# Patient Record
Sex: FEMALE | Race: WHITE | NOT HISPANIC OR LATINO | URBAN - METROPOLITAN AREA
[De-identification: names, ages, dates, MRNs, and addresses within clinical notes are randomized per-mention and may not be internally consistent; named-entity substitution may affect disease eponyms.]

---

## 2018-06-12 ENCOUNTER — OFFICE VISIT (OUTPATIENT)
Dept: DERMATOLOGY | Facility: CLINIC | Age: 48
End: 2018-06-12
Payer: COMMERCIAL

## 2018-06-12 DIAGNOSIS — B35.4 TINEA CORPORIS: Primary | ICD-10-CM

## 2018-06-12 DIAGNOSIS — L82.1 SEBORRHEIC KERATOSES: ICD-10-CM

## 2018-06-12 RX ORDER — CLOTRIMAZOLE 1 %
CREAM (GRAM) TOPICAL 2 TIMES DAILY
Qty: 60 G | Refills: 0 | Status: SHIPPED | OUTPATIENT
Start: 2018-06-12

## 2018-06-12 ASSESSMENT — PAIN SCALES - GENERAL: PAINLEVEL: NO PAIN (0)

## 2018-06-12 NOTE — PROGRESS NOTES
[facility] Dermatology Note      Dermatology Problem List:  1. Tinea corporis - back - clotrimazole 1% cream BID  2. SK - left flank  3. Hx of allergic contact derm to nickel    CC:   Chief Complaint   Patient presents with     Skin Check     Yarely is here today for a skin check- has a couple areas of concern.          Encounter Date: Jun 12, 2018    History of Present Illness:  Ms. Yarely Garcia is a 48 year old female who presents with a spot of concern on her back which has been there about 1 year. She says it has gotten worse and bigger over the last year.  says it looks like a bruise, but it wont go away. It occasionally itches sometimes. No tenderness no swelling.  She also has a mole on her back she would like examined. She has no personal or fhx of skin cancer. She is otherwise healthy.     She baig have a nickel allergy - sos she was wondering if the metal parts of her bra were irritating her skin, but she says typically the reaction she gets with nickel is worse than what this has been.     Past Medical History:   There is no problem list on file for this patient.    History reviewed. No pertinent past medical history.  History reviewed. No pertinent surgical history.    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 The patient admits to use of tanning beds as a teenager, maybe 2x. She uses sunscreen on a regular basis.    Family History:  There is no family history of skin cancer. There is no family history of melanoma.    Medications:  No current outpatient prescriptions on file.     Allergies   Allergen Reactions     Nickel        Review of Systems:  -Constitutional: The patient denies fatigue, fevers, chills, unintended weight loss, and night sweats.  -Skin: As above in HPI. No additional skin concerns.    Physical exam:  Vitals: There were no vitals taken for this visit.  GEN: This is a well developed, well-nourished female in no acute  distress, in a pleasant mood.    SKIN: Waist-up skin, which includes the head/face, neck, both arms, chest, back, abdomen, digits and/or nails was examined.  -3cm slightly hyperpigmented patch with slight scale on the mid back under the bra area  -1 waxy, stuck on papule on the left flank  -No other lesions of concern on areas examined.     Impression/Plan:  1. Tinea corporis - mid back    KOH positive for spores    Clotrimazole 1% cream BID to affected area x 14 days    Discussed the hyperpigmentation may take a bit longer to resolve.     2. Seborrheic keratosis    Reassured benign    edu on etiology of lesions    CC Dr. Corral on close of this encounter.  Follow-up prn for new or changing lesions.       Staff Involved:  Staff Only  All risks, benefits and alternatives were discussed with patient.  Patient is in agreement and understands the assessment and plan.  All questions were answered.    Shyanne Cardona PA-C  [facility]: Phone: [phone], Fax: [phone]

## 2018-06-12 NOTE — LETTER
6/12/2018       RE: Yarely Garcia  [address]  [place] [address]     Dear Colleague,    Thank you for referring your patient, Yarely Garcia, to the [facility] DERMATOLOGY at [facility]. Please see a copy of my visit note below.    [facility] Dermatology Note      Dermatology Problem List:  1. Tinea corporis - back - clotrimazole 1% cream BID  2. SK - left flank  3. Hx of allergic contact derm to nickel    CC:   Chief Complaint   Patient presents with     Skin Check     Yarely is here today for a skin check- has a couple areas of concern.          Encounter Date: Jun 12, 2018    History of Present Illness:  Ms. Yarely aGrcia is a 48 year old female who presents with a spot of concern on her back which has been there about 1 year. She says it has gotten worse and bigger over the last year.  says it looks like a bruise, but it wont go away. It occasionally itches sometimes. No tenderness no swelling.  She also has a mole on her back she would like examined. She has no personal or fhx of skin cancer. She is otherwise healthy.     She baig have a nickel allergy - sos she was wondering if the metal parts of her bra were irritating her skin, but she says typically the reaction she gets with nickel is worse than what this has been.     Past Medical History:   There is no problem list on file for this patient.    History reviewed. No pertinent past medical history.  History reviewed. No pertinent surgical history.    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. The patient admits to use of tanning beds as a teenager, maybe 2x. She uses sunscreen on a regular basis.    Family History:  There is no family history of skin cancer. There is no family history of melanoma.    Medications:  No current outpatient prescriptions on file.     Allergies   Allergen Reactions     Nickel        Review of Systems:  -Constitutional:  The patient denies fatigue, fevers, chills, unintended weight loss, and night sweats.  -Skin: As above in HPI. No additional skin concerns.    Physical exam:  Vitals: There were no vitals taken for this visit.  GEN: This is a well developed, well-nourished female in no acute distress, in a pleasant mood.    SKIN: Waist-up skin, which includes the head/face, neck, both arms, chest, back, abdomen, digits and/or nails was examined.  -3cm slightly hyperpigmented patch with slight scale on the mid back under the bra area  -1 waxy, stuck on papule on the left flank  -No other lesions of concern on areas examined.     Impression/Plan:  1. Tinea corporis - mid back    KOH positive for spores    Clotrimazole 1% cream BID to affected area x 14 days    Discussed the hyperpigmentation may take a bit longer to resolve.     2. Seborrheic keratosis    Reassured benign    edu on etiology of lesions    CC Dr. Corral on close of this encounter.  Follow-up prn for new or changing lesions.       Staff Involved:  Staff Only  All risks, benefits and alternatives were discussed with patient.  Patient is in agreement and understands the assessment and plan.  All questions were answered.    Shyanne Cardona PA-C  [facility]: Phone: [phone], Fax: [phone]              Again, thank you for allowing me to participate in the care of your patient.      Sincerely,    Shyanne Cardona PA-C

## 2018-06-12 NOTE — MR AVS SNAPSHOT
After Visit Summary   2018    Yarely Garcia    MRN: [id number]           Patient Information     Date Of Birth          1970        Visit Information        Provider Department      2018 11:00 AM Shyanne Cardona PA-C [facility] Dermatology        Today's Diagnoses     Tinea corporis    -  1    Seborrheic keratoses           Follow-ups after your visit        Who to contact     Please call your clinic at [phone] to:    Ask questions about your health    Make or cancel appointments    Discuss your medicines    Learn about your test results    Speak to your doctor            Additional Information About Your Visit        MyChart Information     EZChip is an electronic gateway that provides easy, online access to your medical records. With EZChip, you can request a clinic appointment, read your test results, renew a prescription or communicate with your care team.     To sign up for [employer] visit the website at [email or web address]   You will be asked to enter the access code listed below, as well as some personal information. Please follow the directions to create your username and password.     Your access code is: [email or web address]  Expires: 9/10/2018 11:00 AM     Your access code will  in 90 days. If you need help or a new code, please contact your [facility] or call [phone] for assistance.        Care EveryWhere ID     This is your Care EveryWhere ID. This could be used by other organizations to access your [facility] medical records  [id number]         Blood Pressure from Last 3 Encounters:   No data found for BP    Weight from Last 3 Encounters:   No data found for Wt              Today, you had the following     No orders found for display         Today's Medication Changes          These changes are accurate as of 18 11:24 AM.  If you have any questions, ask your nurse or doctor.               Start taking these medicines.         Dose/Directions    clotrimazole 1 % cream   Commonly known as:  LOTRIMIN   Used for:  Tinea corporis   Started by:  Shyanne Cardona PA-C        Apply topically 2 times daily   Quantity:  60 g   Refills:  0            Where to get your medicines      These medications [facility] to [facility] [address] [place] [address]  [address], [place] [address]     Phone:  [phone]     clotrimazole 1 % cream                Primary Care Provider    None Specified       No primary provider on file.        Equal Access to Services     [facility]: Hadii aad ku hadasho Soomaali, waaxda luqadaha, qaybta kaalmada adeegyada, demetrice matos . So [facility] [phone].    ATENCIÓN: Si habla español, tiene a quiñonze disposición servicios gratuitos de asistencia lingüística. [facility] [phone].    We comply with applicable federal civil rights laws and [place] laws. We do not discriminate on the basis of race, color, national origin, age, disability, sex, sexual orientation, or gender identity.            Thank you!     Thank you for choosing [facility] DERMATOLOG[facility]  for your care. Our goal is always to provide you with excellent care. Hearing back from our patients is one way we can continue to improve our services. Please take a few minutes to complete the written survey that you may receive in the mail after your visit with us. Thank you!             Your Updated Medication List - Protect others around you: Learn how to safely use, store and throw away your medicines at www.disposemymeds.org.          This list is accurate as of 6/12/18 11:24 AM.  Always use your most recent med list.                   Brand Name Dispense Instructions for use Diagnosis    clotrimazole 1 % cream    LOTRIMIN    60 g    Apply topically 2 times daily    Tinea corporis

## 2018-06-12 NOTE — LETTER
Date:June 13, 2018      Patient was self referred, no letter generated. Do not send.        [facility] Information

## 2018-06-12 NOTE — NURSING NOTE
Dermatology Rooming Note    Yarely Garcia's goals for this visit include:   Chief Complaint   Patient presents with     Skin Check     Yarely is here today for a skin check- has a couple areas of concern.      Sheyla Galicia MA

## 2018-06-14 ENCOUNTER — TRANSFERRED RECORDS (OUTPATIENT)
Dept: HEALTH INFORMATION MANAGEMENT | Facility: CLINIC | Age: 48
End: 2018-06-14

## 2018-06-26 ENCOUNTER — OFFICE VISIT (OUTPATIENT)
Dept: FAMILY MEDICINE | Facility: CLINIC | Age: 48
End: 2018-06-26
Payer: COMMERCIAL

## 2018-06-26 VITALS — DIASTOLIC BLOOD PRESSURE: 77 MMHG | SYSTOLIC BLOOD PRESSURE: 123 MMHG | HEART RATE: 58 BPM | OXYGEN SATURATION: 99 %

## 2018-06-26 DIAGNOSIS — D48.5 NEOPLASM OF UNCERTAIN BEHAVIOR OF SKIN: Primary | ICD-10-CM

## 2018-06-26 DIAGNOSIS — D22.9 MELANOCYTIC NEVUS, UNSPECIFIED LOCATION: ICD-10-CM

## 2018-06-26 PROCEDURE — 99213 OFFICE O/P EST LOW 20 MIN: CPT | Mod: 25 | Performed by: FAMILY MEDICINE

## 2018-06-26 PROCEDURE — 11300 SHAVE SKIN LESION 0.5 CM/<: CPT | Performed by: FAMILY MEDICINE

## 2018-06-26 PROCEDURE — 88305 TISSUE EXAM BY PATHOLOGIST: CPT | Mod: TC | Performed by: FAMILY MEDICINE

## 2018-06-26 NOTE — MR AVS SNAPSHOT
"              After Visit Summary   6/26/2018    Yarely Garcia    MRN: [id number]           Patient Information     Date Of Birth          1970        Visit Information        Provider Department      6/26/2018 8:40 AM Olivia Yun [place] [facility]        Today's Diagnoses     Neoplasm of uncertain behavior of skin    -  1      Care Instructions    FUTURE APPOINTMENTS  Follow up per pathology report.    WOUND CARE INSTRUCTIONS  1. Wash hands before every dressing change.  2. After 24 hours, change dressing daily.  3. Wash the wound area with a mild soap, then rinse.  4. Gently pat dry with a sterile gauze or Q-tip.  5. Using a Q-tip, apply Vaseline or Aquaphor only over entire wound. Do NOT use Neosporin - as many people react to neomycin.  6. Finally, cover with a bandage or sterile non-stick gauze with micropore paper tape.  7. Repeat once daily until wound has healed.      Soap, water and shampoo will not hurt this area.    Do not go swimming or take baths, but showering is encouraged.    Limit use of the area where the procedure was done for a few days to allow for optimal healing.    If you experience bleeding:  Wash hands and hold firm pressure on the area for 10 minutes without checking to see if the bleeding has stopped. \"Checking\" pulls off the protective wound clot and restarts the bleeding all over again. Re-apply pressure for 10 minutes if necessary to stop bleeding.  Use additional sterile gauze and tape to maintain pressure once bleeding has stopped.  If bleeding continues, then call back to clinic at [phone].    Signs of Infection:  Infection can occur in any area where skin has been disrupted.  If you notice persistent redness, swelling, colored drainage, increasing pain, fever or other signs of infection, please call us at: [phone] and ask to have me or my colleague paged. We will call you back to discuss.    Pathology Results:  You will be notified, " generally via letter or MyChart, in approximately 10 days. If there is anything we need to discuss or further treatment needed, I will call you to discuss it.    PATIENT INFORMATION : WOUNDS  During the healing process you will notice a number of changes. All wounds develop a small halo of redness surrounding the wound.  This means healing is occurring. Severe itching with extensive redness usually indicates sensitivity to the ointment or bandage tape used to dress the wound.  You should call our office if this develops.      Swelling  and/or discoloration around your surgical site is common, particularly when performed around the eye.    All wounds normally drain.  The larger the wound the more drainage there will be.  After 7-10 days, you will notice the wound beginning to shrink and new skin will begin to grow.  The wound is healed when you can see skin has formed over the entire area.  A healed wound has a healthy, shiny look to the surface and is red to dark pink in color to normalize.  Wounds may take approximately 4-6 weeks to heal.  Larger wounds may take 6-8 weeks. After the wound is healed you may discontinue dressing changes.    You may experience a sensation of tightness as your wound heals. This is normal and will gradually subside.    Your healed wound may be sensitive to temperature changes. This sensitivity improves with time, but if you re having a lot of discomfort, try to avoid temperature extremes.    Patients frequently experience itching after their wound appears to have healed because of the continue healing under the skin.  Plain Vaseline will help relieve the itching.          Follow-ups after your visit        Who to contact     If you have questions or need follow up information about today's clinic visit or your schedule please contact [facility] directly at [phone].  Normal or non-critical lab and imaging results will be communicated to you by MyChart, letter or  phone within 4 business days after the clinic has received the results. If you do not hear from us within 7 days, please contact the clinic through rubberit or phone. If you have a critical or abnormal lab result, we will notify you by phone as soon as possible.  Submit refill requests through rubberit or call your pharmacy and they will forward the refill request to us. Please allow 3 business days for your refill to be completed.          Additional Information About Your Visit        [employer] Software Information     rubberit gives you secure access to your electronic health record. If you see a primary care provider, you can also send messages to your care team and make appointments. If you have questions, please call your primary care clinic.  If you do not have a primary care provider, please call [phone] and they will assist you.        Care EveryWhere ID     This is your Care EveryWhere ID. This could be used by other organizations to access your [facility] medical records  [id number]        Your Vitals Were     Pulse Pulse Oximetry                58 99%           Blood Pressure from Last 3 Encounters:   06/26/18 123/77    Weight from Last 3 Encounters:   No data found for Wt              Today, you had the following     No orders found for display       Primary Care Provider Office Phone # Fax #    [facility] [phone]       [address] [place] [address]        Equal Access to Services     [facility] : Hadii juan pablo ku hadasho Soomaali, waaxda luqadaha, qaybta kaalmada adeegyada, waxcarly idiin hayaan geovanna matos . So [facility] [phone].    ATENCIÓN: Si habla español, tiene a quiñonez disposición servicios gratuitos de asistencia lingüística. [facility] al [phone].    We comply with applicable federal civil rights laws and [place] laws. We do not discriminate on the basis of race, color, national origin, age, disability, sex, sexual orientation, or gender  identity.            Thank you!     Thank you for choosing [facility]  for your care. Our goal is always to provide you with excellent care. Hearing back from our patients is one way we can continue to improve our services. Please take a few minutes to complete the written survey that you may receive in the mail after your visit with us. Thank you!             Your Updated Medication List - Protect others around you: Learn how to safely use, store and throw away your medicines at www.disposemymeds.org.          This list is accurate as of 6/26/18  8:51 AM.  Always use your most recent med list.                   Brand Name Dispense Instructions for use Diagnosis    clotrimazole 1 % cream    LOTRIMIN    60 g    Apply topically 2 times daily    Tinea corporis

## 2018-06-26 NOTE — PATIENT INSTRUCTIONS
"FUTURE APPOINTMENTS  Follow up per pathology report.    WOUND CARE INSTRUCTIONS  1. Wash hands before every dressing change.  2. After 24 hours, change dressing daily.  3. Wash the wound area with a mild soap, then rinse.  4. Gently pat dry with a sterile gauze or Q-tip.  5. Using a Q-tip, apply Vaseline or Aquaphor only over entire wound. Do NOT use Neosporin - as many people react to neomycin.  6. Finally, cover with a bandage or sterile non-stick gauze with micropore paper tape.  7. Repeat once daily until wound has healed.      Soap, water and shampoo will not hurt this area.    Do not go swimming or take baths, but showering is encouraged.    Limit use of the area where the procedure was done for a few days to allow for optimal healing.    If you experience bleeding:  Wash hands and hold firm pressure on the area for 10 minutes without checking to see if the bleeding has stopped. \"Checking\" pulls off the protective wound clot and restarts the bleeding all over again. Re-apply pressure for 10 minutes if necessary to stop bleeding.  Use additional sterile gauze and tape to maintain pressure once bleeding has stopped.  If bleeding continues, then call back to clinic at [phone].    Signs of Infection:  Infection can occur in any area where skin has been disrupted.  If you notice persistent redness, swelling, colored drainage, increasing pain, fever or other signs of infection, please call us at: [phone] and ask to have me or my colleague paged. We will call you back to discuss.    Pathology Results:  You will be notified, generally via letter or MyChart, in approximately 10 days. If there is anything we need to discuss or further treatment needed, I will call you to discuss it.    PATIENT INFORMATION : WOUNDS  During the healing process you will notice a number of changes. All wounds develop a small halo of redness surrounding the wound.  This means healing is occurring. Severe itching with extensive " redness usually indicates sensitivity to the ointment or bandage tape used to dress the wound.  You should call our office if this develops.      Swelling  and/or discoloration around your surgical site is common, particularly when performed around the eye.    All wounds normally drain.  The larger the wound the more drainage there will be.  After 7-10 days, you will notice the wound beginning to shrink and new skin will begin to grow.  The wound is healed when you can see skin has formed over the entire area.  A healed wound has a healthy, shiny look to the surface and is red to dark pink in color to normalize.  Wounds may take approximately 4-6 weeks to heal.  Larger wounds may take 6-8 weeks. After the wound is healed you may discontinue dressing changes.    You may experience a sensation of tightness as your wound heals. This is normal and will gradually subside.    Your healed wound may be sensitive to temperature changes. This sensitivity improves with time, but if you re having a lot of discomfort, try to avoid temperature extremes.    Patients frequently experience itching after their wound appears to have healed because of the continue healing under the skin.  Plain Vaseline will help relieve the itching.

## 2018-06-26 NOTE — LETTER
"    6/26/2018         RE: Yarely Garcia  [address]  [place] [address]        Dear Colleague,    Thank you for referring your patient, Yarely Garcia, [facility]. Please see a copy of my visit note below.    [facility] - PRIMARY CARE SKIN    CC : Lesion(s)  SUBJECTIVE:                                                    Yarely Garcia is a 48 year old female who presents to clinic today because of a bleeding mole on the left arm.    Bothersome lesions noticed by the patient or other skin concerns :  Issue One : This mole on the left arm has been bleeding. She first noticed this 1 week ago when taking off a sweater. This has been catching on clothing. This mole has always been \"upraised\". She denies tick bite.  Onset : unknown.  Enlarging : unsure.  Bleeding : YES  Itchy or irritating : YES.  Pain or tenderness : YES.  Changing color : NO.  Issue Two :  A scab has developed over the last 1 week on a mole on the right arm.    Personal history of skin cancer : NO.  Family history of skin cancer : NO.  Autoimmune disorders : YES - niece with lupus    Sun Exposure History  Previous history of significant sun exposure:  Blistering sunburns : YES - approximately 4 times  Tanning beds : YES - when younger.  Sunscreen Use : YES, SPF : 50-70.  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    Refer to electronic medical record (EMR) for past medical history and medications.    INTEGUMENTARY/SKIN: POSITIVE for mole changes  ROS : 14 point review of systems was negative except the symptoms listed above in the HPI.    This document serves as a record of the services and decisions personally performed and made by Jazmin Yun MD. It was created on her behalf by Max Gay, a trained medical scribe.  The creation of this document is based on the scribe's personal " "observations and the provider's statements to the medical scribe.  Max Gay, June 26, 2018 8:40 AM      OBJECTIVE:                                                    GENERAL: healthy, alert and no distress  SKIN: [place] Skin Type - II.  Arms were examined. The dermatoscope was used to help evaluate pigmented lesions.  Skin Pertinent Findings:  Right arm : Multiple brown macules of various sizes and shapes most consistent with (benign) melanocytic nevi. Brown, macule(s) most consistent with benign solar lentigo.    Right radial mid-forearm : benign solar lentigo with superficial excoriation(s).    Left dorsal forearm, 2 cm distal to elbow : 5 mm in size, raised, brown, smooth lesion with eschar.    Diagnostic Test Results:  none           ASSESSMENT:                                                      Encounter Diagnosis   Name Primary?     Neoplasm of uncertain behavior of skin Yes         PLAN:                                                    Patient Instructions   FUTURE APPOINTMENTS  Follow up per pathology report.    WOUND CARE INSTRUCTIONS  1. Wash hands before every dressing change.  2. After 24 hours, change dressing daily.  3. Wash the wound area with a mild soap, then rinse.  4. Gently pat dry with a sterile gauze or Q-tip.  5. Using a Q-tip, apply Vaseline or Aquaphor only over entire wound. Do NOT use Neosporin - as many people react to neomycin.  6. Finally, cover with a bandage or sterile non-stick gauze with micropore paper tape.  7. Repeat once daily until wound has healed.      Soap, water and shampoo will not hurt this area.    Do not go swimming or take baths, but showering is encouraged.    Limit use of the area where the procedure was done for a few days to allow for optimal healing.    If you experience bleeding:  Wash hands and hold firm pressure on the area for 10 minutes without checking to see if the bleeding has stopped. \"Checking\" pulls off the protective wound clot and restarts " the bleeding all over again. Re-apply pressure for 10 minutes if necessary to stop bleeding.  Use additional sterile gauze and tape to maintain pressure once bleeding has stopped.  If bleeding continues, then call back to clinic at [phone].    Signs of Infection:  Infection can occur in any area where skin has been disrupted.  If you notice persistent redness, swelling, colored drainage, increasing pain, fever or other signs of infection, please call us at: [phone] and ask to have me or my colleague paged. We will call you back to discuss.    Pathology Results:  You will be notified, generally via letter or MyChart, in approximately 10 days. If there is anything we need to discuss or further treatment needed, I will call you to discuss it.    PATIENT INFORMATION : WOUNDS  During the healing process you will notice a number of changes. All wounds develop a small halo of redness surrounding the wound.  This means healing is occurring. Severe itching with extensive redness usually indicates sensitivity to the ointment or bandage tape used to dress the wound.  You should call our office if this develops.      Swelling  and/or discoloration around your surgical site is common, particularly when performed around the eye.    All wounds normally drain.  The larger the wound the more drainage there will be.  After 7-10 days, you will notice the wound beginning to shrink and new skin will begin to grow.  The wound is healed when you can see skin has formed over the entire area.  A healed wound has a healthy, shiny look to the surface and is red to dark pink in color to normalize.  Wounds may take approximately 4-6 weeks to heal.  Larger wounds may take 6-8 weeks. After the wound is healed you may discontinue dressing changes.    You may experience a sensation of tightness as your wound heals. This is normal and will gradually subside.    Your healed wound may be sensitive to temperature changes. This sensitivity  improves with time, but if you re having a lot of discomfort, try to avoid temperature extremes.    Patients frequently experience itching after their wound appears to have healed because of the continue healing under the skin.  Plain Vaseline will help relieve the itching.        PROCEDURES:                                                    Name : Shave Excision  Indication : Excision of tissue for pathology evaluation.  Location(s) : Left dorsal forearm, 2 cm distal to elbow : 5 mm in size, raised, brown, smooth lesion with eschar..  Completed by : Jazmin Yun MD  Photo Taken : no.  Anesthesia : Patient was anesthetized by infiltrating the area surrounding the lesion with 1% lidocaine.   epinephrine [id number]:[id number] : Yes.  Buffered with bicarbonate : Yes.  Note : Discussed the risk of pain, infection, scarring, hypo- or hyperpigmentation and recurrence or need for re-treatment. The benefits of treatment and alternative treatments were also discussed.    During this procedure, the universal protocol was utilized. The patient's identity was confirmed by no less than two patient identifiers, correct procedure was verified, correct site was verified and marked as applicable and a final pause was completed.    Sterile technique was used throughout the procedure. The skin was cleaned and prepped with surgical cleanser. Once adequate anesthesia was obtained, the lesion was removed with a deep scallop shave procedure. The specimen was sent to pathology.    Direct pressure and aluminum chloride and monopolar cautery was applied for hemostasis. No bleeding was present upon the completion of the procedure. The wound was coated with antibacterial ointment. A dry sterile dressing was applied. Patient tolerated the procedure well and left in satisfactory condition.    Primary provider and referring provider will be informed regarding the tissue report when it returns.      The information in this document, created by the medical  scribe for me, accurately reflects the services I personally performed and the decisions made by me. I have reviewed and approved this document for accuracy prior to leaving the patient care area.  Jazmin Yun MD June 26, 2018 8:40 AM  [facility]    Again, thank you for allowing me to participate in the care of your patient.        Sincerely,        Olivia Yun MD

## 2018-06-26 NOTE — PROGRESS NOTES
"[facility] - PRIMARY CARE SKIN    CC : Lesion(s)  SUBJECTIVE:                                                    Yarely Garcia is a 48 year old female who presents to clinic today because of a bleeding mole on the left arm.    Bothersome lesions noticed by the patient or other skin concerns :  Issue One : This mole on the left arm has been bleeding. She first noticed this 1 week ago when taking off a sweater. This has been catching on clothing. This mole has always been \"upraised\". She denies tick bite.  Onset : unknown.  Enlarging : unsure.  Bleeding : YES  Itchy or irritating : YES.  Pain or tenderness : YES.  Changing color : NO.  Issue Two :  A scab has developed over the last 1 week on a mole on the right arm.    Personal history of skin cancer : NO.  Family history of skin cancer : NO.  Autoimmune disorders : YES - niece with lupus    Sun Exposure History  Previous history of significant sun exposure:  Blistering sunburns : YES - approximately 4 times  Tanning beds : YES - when younger.  Sunscreen Use : YES, SPF : 50-70.  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    Refer to electronic medical record (EMR) for past medical history and medications.    INTEGUMENTARY/SKIN: POSITIVE for mole changes  ROS : 14 point review of systems was negative except the symptoms listed above in the HPI.    This document serves as a record of the services and decisions personally performed and made by Jazmin Yun MD. It was created on her behalf by Max Gay, a trained medical scribe.  The creation of this document is based on the scribe's personal observations and the provider's statements to the medical scribe.  Max Gay, June 26, 2018 8:40 AM      OBJECTIVE:                                                    GENERAL: healthy, alert and no distress  SKIN: [place] Skin Type - II.  Arms " "were examined. The dermatoscope was used to help evaluate pigmented lesions.  Skin Pertinent Findings:  Right arm : Multiple brown macules of various sizes and shapes most consistent with (benign) melanocytic nevi. Brown, macule(s) most consistent with benign solar lentigo.    Right radial mid-forearm : benign solar lentigo with superficial excoriation(s).    Left dorsal forearm, 2 cm distal to elbow : 5 mm in size, raised, brown, smooth lesion with eschar.    Diagnostic Test Results:  none           ASSESSMENT:                                                      Encounter Diagnosis   Name Primary?     Neoplasm of uncertain behavior of skin Yes         PLAN:                                                    Patient Instructions   FUTURE APPOINTMENTS  Follow up per pathology report.    WOUND CARE INSTRUCTIONS  1. Wash hands before every dressing change.  2. After 24 hours, change dressing daily.  3. Wash the wound area with a mild soap, then rinse.  4. Gently pat dry with a sterile gauze or Q-tip.  5. Using a Q-tip, apply Vaseline or Aquaphor only over entire wound. Do NOT use Neosporin - as many people react to neomycin.  6. Finally, cover with a bandage or sterile non-stick gauze with micropore paper tape.  7. Repeat once daily until wound has healed.      Soap, water and shampoo will not hurt this area.    Do not go swimming or take baths, but showering is encouraged.    Limit use of the area where the procedure was done for a few days to allow for optimal healing.    If you experience bleeding:  Wash hands and hold firm pressure on the area for 10 minutes without checking to see if the bleeding has stopped. \"Checking\" pulls off the protective wound clot and restarts the bleeding all over again. Re-apply pressure for 10 minutes if necessary to stop bleeding.  Use additional sterile gauze and tape to maintain pressure once bleeding has stopped.  If bleeding continues, then call back to clinic at [phone].    Signs of Infection:  Infection can occur in any area where skin has been disrupted.  If you notice persistent redness, swelling, colored drainage, increasing pain, fever or other signs of infection, please call us at: [phone] and ask to have me or my colleague paged. We will call you back to discuss.    Pathology Results:  You will be notified, generally via letter or MyChart, in approximately 10 days. If there is anything we need to discuss or further treatment needed, I will call you to discuss it.    PATIENT INFORMATION : WOUNDS  During the healing process you will notice a number of changes. All wounds develop a small halo of redness surrounding the wound.  This means healing is occurring. Severe itching with extensive redness usually indicates sensitivity to the ointment or bandage tape used to dress the wound.  You should call our office if this develops.      Swelling  and/or discoloration around your surgical site is common, particularly when performed around the eye.    All wounds normally drain.  The larger the wound the more drainage there will be.  After 7-10 days, you will notice the wound beginning to shrink and new skin will begin to grow.  The wound is healed when you can see skin has formed over the entire area.  A healed wound has a healthy, shiny look to the surface and is red to dark pink in color to normalize.  Wounds may take approximately 4-6 weeks to heal.  Larger wounds may take 6-8 weeks. After the wound is healed you may discontinue dressing changes.    You may experience a sensation of tightness as your wound heals. This is normal and will gradually subside.    Your healed wound may be sensitive to temperature changes. This sensitivity improves with time, but if you re having a lot of discomfort, try to avoid temperature extremes.    Patients frequently experience itching after their wound appears to have healed because of the continue healing under the skin.  Plain  Vaseline will help relieve the itching.        PROCEDURES:                                                    Name : Shave Excision  Indication : Excision of tissue for pathology evaluation.  Location(s) : Left dorsal forearm, 2 cm distal to elbow : 5 mm in size, raised, brown, smooth lesion with eschar..  Completed by : Jazmin Yun MD  Photo Taken : no.  Anesthesia : Patient was anesthetized by infiltrating the area surrounding the lesion with 1% lidocaine.   epinephrine 1:[id number] : Yes.  Buffered with bicarbonate : Yes.  Note : Discussed the risk of pain, infection, scarring, hypo- or hyperpigmentation and recurrence or need for re-treatment. The benefits of treatment and alternative treatments were also discussed.    During this procedure, the universal protocol was utilized. The patient's identity was confirmed by no less than two patient identifiers, correct procedure was verified, correct site was verified and marked as applicable and a final pause was completed.    Sterile technique was used throughout the procedure. The skin was cleaned and prepped with surgical cleanser. Once adequate anesthesia was obtained, the lesion was removed with a deep scallop shave procedure. The specimen was sent to pathology.    Direct pressure and aluminum chloride and monopolar cautery was applied for hemostasis. No bleeding was present upon the completion of the procedure. The wound was coated with antibacterial ointment. A dry sterile dressing was applied. Patient tolerated the procedure well and left in satisfactory condition.    Primary provider and referring provider will be informed regarding the tissue report when it returns.      The information in this document, created by the medical scribe for me, accurately reflects the services I personally performed and the decisions made by me. I have reviewed and approved this document for accuracy prior to leaving the patient care area.  Jazmin Yun MD June 26, 2018 8:40  AM  [facility]

## 2018-06-27 ENCOUNTER — HEALTH MAINTENANCE LETTER (OUTPATIENT)
Age: 48
End: 2018-06-27

## 2018-07-02 LAB — COPATH REPORT: NORMAL

## 2018-07-11 ENCOUNTER — TRANSFERRED RECORDS (OUTPATIENT)
Dept: HEALTH INFORMATION MANAGEMENT | Facility: CLINIC | Age: 48
End: 2018-07-11

## 2018-07-12 ENCOUNTER — TRANSFERRED RECORDS (OUTPATIENT)
Dept: HEALTH INFORMATION MANAGEMENT | Facility: CLINIC | Age: 48
End: 2018-07-12

## 2018-07-12 NOTE — TELEPHONE ENCOUNTER
1.  Date of consult: 8/9/18    2.  Reason for consult: Cervical mass, enlarged uterus    3.  Referring provider/facility: Lisa Andrade / [facility]    4. Scheduled by: Vera  malia      5.  Outside records requested from: [facility]    6.  Additional Information: Pt is having US on 7/12/18, CT on 7/13/18 & MRI 7/16/18

## 2018-07-13 ENCOUNTER — TRANSFERRED RECORDS (OUTPATIENT)
Dept: HEALTH INFORMATION MANAGEMENT | Facility: CLINIC | Age: 48
End: 2018-07-13

## 2018-07-17 NOTE — PROGRESS NOTES
Consult Notes on Referred Patient    Dr. Carmelita Andrade  [facility]  [address] [place] [address]     RE: Yarely Garcia  : 1970  DALIA: 2018     Yarely Garcia is a very pleasant 48 year old woman with a recent diagnosis of cervical versus uterine carcinoma.  Given these findings she was subsequently sent to the [facility] for new patient consultation.     Brief History:  2018: saw an OBGYN at [place]. US showed an enlarged uterus without fibroids. A hysterectomy was recommended at this time. Hgb 8.9    18: saw another OBGYN for consult for a robotic hysterectomy. An endometrial biopsy was attempted but was unable to be performed due to profuse vaginal bleeding.     18: Endometrial biopsy attempted again, unable to perform due to closed cervical os. Uterus noted to be enlarged and cervix appeared abnormal. Cervix was hard, uterus mobile.   Pap smear - positive for malignancy    18: Pelvic US  Results: anteverted uterus, presence of fibroids with possible 1.45 cm pedunculated fibroid on the right. 4.02 x 3.15 x 6.07 cm mass was noted posterior and to the right of the cervix. Unable to determine if ovarian. L ovary visualized transabdominally, appears normal. No fluid in cul de sac.     18: CT cap  Impression:  1. Findings compatible with extensive metastatic disease. Innumerable noncalcified pulmonary nodules, bilateral enlarged hilar nodules, retrocrural, retroperitoneal and pelvic lymphadenopathy. Enlarged mesenteric and adnexal nodules in the low abdomen.   2. Lytic lesion in the right inferior sacrum measuring 3.1 cm again suspicious for metastatic involvement. No other definitive bone lesions appreciated.   3. Diffuse enlargement of the uterus and cervix  4. Liver and adrenals appear unremarkable  5. No bowel or biliary abnormality.  6. There appears to be extrinsic compression of the external iliac veins from  bulky pelvic nodes.  7. Mild prominence of the upper ureters w/o overt hydronephrosis.       Today:   Starting in May she has had bleeding for 3 weeks. Starting again on June 4th she has had bleeding and it hasn't stopped since. She also has a discharge that has a foul odor to it. She has some abdominal pain but most of her pain is across her lower back and down her thighs. She sometimes had a tingling sensation as well. She was started on tylenol with oxycodone on Monday for her back and leg pain because tylenol alone was not covering her pain. She is concerned about taking too much pain medications and possible constipation as a result.    She has had difficulty emptying her bladder for the past few days. She hasn't had a bowel movement since Tuesday but also has not been eating much lately. She doesn't have much of an appetite but has been making sure she gets enough nutrition. She is employed as a dietician.     The patient's most recent hemoglobin was 8.9 at which time she was started on Iron. Her recent pap smear was positive for malignancy and a biopsy was taken. She was told she has evidence of metastasis but further conversation regarding her diagnosis was deferred to her visit with an oncologist.      Review of Systems:  Answers for HPI/ROS submitted by the patient on 7/18/2018   General Symptoms: Yes  Skin Symptoms: Yes  HENT Symptoms: No  EYE SYMPTOMS: No  HEART SYMPTOMS: No  LUNG SYMPTOMS: No  INTESTINAL SYMPTOMS: Yes  URINARY SYMPTOMS: Yes  GYNECOLOGIC SYMPTOMS: Yes  BREAST SYMPTOMS: No  SKELETAL SYMPTOMS: Yes  BLOOD SYMPTOMS: No  NERVOUS SYSTEM SYMPTOMS: No  MENTAL HEALTH SYMPTOMS: No  Fever: No  Loss of appetite: Yes  Weight loss: Yes  Weight gain: No  Fatigue: Yes  Night sweats: No  Chills: No  Increased stress: Yes  Excessive hunger: No  Excessive thirst: Yes  Feeling hot or cold when others believe the temperature is normal: No  Loss of height: No  Post-operative complications: No  Surgical site  pain: No  Hallucinations: No  Change in or Loss of Energy: Yes  Hyperactivity: No  Confusion: No  Changes in hair: No  Changes in moles/birth marks: No  Itching: Yes  Rashes: No  Changes in nails: No  Acne: No  Hair in places you don't want it: No  Change in facial hair: No  Warts: No  Non-healing sores: No  Scarring: No  Flaking of skin: No  Color changes of hands/feet in cold : No  Sun sensitivity: No  Skin thickening: No  Heart burn or indigestion: No  Nausea: Yes  Vomiting: Yes  Abdominal pain: Yes  Bloating: Yes  Constipation: Yes  Diarrhea: No  Blood in stool: No  Black stools: No  Rectal or Anal pain: No  Fecal incontinence: No  Yellowing of skin or eyes: No  Vomit with blood: No  Change in stools: Yes  Trouble holding urine or incontinence: No  Pain or burning: Yes  Trouble starting or stopping: Yes  Increased frequency of urination: No  Blood in urine: No  Decreased frequency of urination: No  Frequent nighttime urination: No  Flank pain: Yes  Difficulty emptying bladder: Yes  Back pain: Yes  Muscle aches: Yes  Neck pain: No  Swollen joints: No  Joint pain: Yes  Bone pain: Yes  Muscle cramps: No  Muscle weakness: Yes  Joint stiffness: No  Bone fracture: No  Bleeding or spotting between periods: Yes  Heavy or painful periods: Yes  Irregular periods: Yes  Vaginal discharge: Yes  Hot flashes: No  Vaginal dryness: No  Genital ulcers: No  Reduced libido: Yes  Painful intercourse: No  Difficulty with sexual arousal: Yes  Post-menopausal bleeding: No    I have reviewed and addressed the patient's review of symptoms for today's visit.     Past Medical History:  Abnormal vaginal bleeding  Anemia  cervical mass  Enlarged uterus  menorrhagia with irregular cycle    Past Surgical History:  No history of surgery.     Health Maintenance:  Health Maintenance Due   Topic Date Due     PHQ-2 Q1 YR  04/22/1984     TETANUS IMMUNIZATION (SYSTEM ASSIGNED)  04/22/1990     HIV SCREEN (SYSTEM ASSIGNED)  04/22/1990     PAP SCREENING  "Q3 YR (SYSTEM ASSIGNED)  04/22/1993     LIPID SCREEN Q5 YR FEMALE (SYSTEM ASSIGNED)  04/22/2017       Last Pap Smear: 7/11/18 Result: positive for malignancy,   She has not had a previous history of abnormal Pap smears.    Last Mammogram: 7/20/2017, mass in posterior L lateral breast is stable    Last Colonoscopy: Not done    Current Medications:   has a current medication list which includes the following prescription(s): clotrimazole.     Allergies:      Allergies   Allergen Reactions     Nickel          Social History:  Social History   Substance Use Topics     Smoking status: Never Smoker     Smokeless tobacco: Never Used     Alcohol use Not on file       History   Drug Use Not on file       Family History:   The patient's family history is notable for: No known cancers.    Family History   Problem Relation Age of Onset     Melanoma No family hx of      Skin Cancer No family hx of        Physical Exam:   BP 93/65  Pulse 113  Temp 98.2  F (36.8  C) (Oral)  Ht 1.585 m (5' 2.4\")  Wt 70.9 kg (156 lb 3.2 oz)  LMP  (Approximate)  BMI 28.2 kg/m2  Body mass index is 28.2 kg/(m^2).    General Appearance: healthy and alert, distress with diagnosis appropriate     HEENT:  no thyromegaly, no palpable nodules or masses        Cardiovascular: Tachycardic with a regular rhythm, no gallops, rubs or murmurs     Respiratory: lungs clear, no rales, rhonchi or wheezes, normal diaphragmatic excursion    Musculoskeletal: extremities non tender and without edema    Skin: no lesions or rashes     Neurological: normal gait, no gross defects     Psychiatric: appropriate mood and affect                               Hematological: normal cervical, supraclavicular and inguinal lymph nodes     Gastrointestinal:       abdomen firm up to the level of the umbilicus, non-tender, moderately-distended, no organomegaly or masses    Genitourinary: External genitalia and urethral meatus appears normal.  Vagina is smooth without nodularity.  " Cervix is completely replaced by a necrotic mass with bloody discharge in the vaginal vault. Bimanual exam reveals a firm mass extending several centimeters laterally on both sides, uterus is enlarged but mobile.  Recto-vaginal exam confirms these findings.      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     Plan:    1.) Cervical versus endometrial carcinoma: I discussed the patients exam findings and imaging with her and her  today. Her cervical exam is suggestive of cervical carcinoma but given her lack of abnormal pap smears, negative HPV testing and lack of obstruction of the ureters it is also very likely that this might be an endometrial cancer that has infiltrated into the cervix. The patient is scheduled for a PET scan at [facility] this week. Based on her recent CT imaging she likely has stage IV disease and is most likely not a surgical candidate. I discussed with the patient that based on the current information, I would recommended chemotherapy but we will know more as more information is gathered.  - biopsies were taken of the cervical tumor today  - Patient scheduled for PET scan this week  - labs were ordered to follow up on recent blood loss anemia and continued bleeding. Her hgb is increased from 8.9 to 10.1   - Follow up after PET results are back     2.) Genetic risk factors were assessed and the patient does not meet the qualifications for a referral at this time.    3.) Labs and/or tests ordered include:  CBC, CMP, cervical biopsy     4.) Health maintenance issues addressed today include: none     5.)  Pain: the patient has been having a lot of pain in the past week and has been taking percocet for this. She needs a refill of this which was provided today. She was also given a prescription for 5 mg oxycodone as the percocet is not completely  covering her pain.       Thank you for allowing us to participate in the care of your patient.       Sincerely,    Antonieta Sawyer MD    Department of Ob/Gyn and Women's Health  Division of Gynecologic Oncology  [facility]  [phone]    I saw and evaluated the patient with the medical student. I reviewed and edited the above note.  The medical student acted as a scribe for this visit.    CC  Patient Care Team:  [facility] PCP - NICOLE Perez      This document serves as a record of the services and decisions personally performed by Antonieta Sawyer MD. It was created on her behalf by Fer Ortiz, a trained medical student. The creation of this record is based on the provider's observations and the statements of the patient.      Fer Ortiz, MS4  July 19, 2018

## 2018-07-18 PROCEDURE — 87624 HPV HI-RISK TYP POOLED RSLT: CPT | Performed by: GENERAL ACUTE CARE HOSPITAL

## 2018-07-18 PROCEDURE — 88341 IMHCHEM/IMCYTCHM EA ADD ANTB: CPT | Performed by: OBSTETRICS & GYNECOLOGY

## 2018-07-18 PROCEDURE — 00000159 ZZHCL STATISTIC H-SEND OUTS PREP: Performed by: OBSTETRICS & GYNECOLOGY

## 2018-07-18 PROCEDURE — 88342 IMHCHEM/IMCYTCHM 1ST ANTB: CPT | Performed by: OBSTETRICS & GYNECOLOGY

## 2018-07-18 PROCEDURE — 00000346 ZZHCL STATISTIC REVIEW OUTSIDE SLIDES TC 88321: Performed by: OBSTETRICS & GYNECOLOGY

## 2018-07-18 ASSESSMENT — ENCOUNTER SYMPTOMS
DIARRHEA: 0
POLYDIPSIA: 1
INCREASED ENERGY: 1
DYSURIA: 1
BACK PAIN: 1
NAIL CHANGES: 0
VOMITING: 1
JAUNDICE: 0
MYALGIAS: 1
ARTHRALGIAS: 1
SKIN CHANGES: 0
BOWEL INCONTINENCE: 0
DECREASED APPETITE: 1
NECK PAIN: 0
FATIGUE: 1
ABDOMINAL PAIN: 1
NIGHT SWEATS: 0
HOT FLASHES: 0
CONSTIPATION: 1
ALTERED TEMPERATURE REGULATION: 0
WEIGHT GAIN: 0
NAUSEA: 1
HALLUCINATIONS: 0
BLOOD IN STOOL: 0
CHILLS: 0
STIFFNESS: 0
DECREASED LIBIDO: 1
FLANK PAIN: 1
WEIGHT LOSS: 1
HEMATURIA: 0
FEVER: 0
RECTAL PAIN: 0
BLOATING: 1
POOR WOUND HEALING: 0
JOINT SWELLING: 0
DIFFICULTY URINATING: 1
MUSCLE WEAKNESS: 1
HEARTBURN: 0
POLYPHAGIA: 0
MUSCLE CRAMPS: 0

## 2018-07-19 ENCOUNTER — ONCOLOGY VISIT (OUTPATIENT)
Dept: ONCOLOGY | Facility: CLINIC | Age: 48
End: 2018-07-19
Attending: OBSTETRICS & GYNECOLOGY
Payer: COMMERCIAL

## 2018-07-19 VITALS
SYSTOLIC BLOOD PRESSURE: 93 MMHG | BODY MASS INDEX: 28.74 KG/M2 | WEIGHT: 156.2 LBS | HEART RATE: 113 BPM | TEMPERATURE: 98.2 F | HEIGHT: 62 IN | DIASTOLIC BLOOD PRESSURE: 65 MMHG

## 2018-07-19 DIAGNOSIS — R10.2 PELVIC PAIN IN FEMALE: ICD-10-CM

## 2018-07-19 DIAGNOSIS — N93.9 VAGINAL BLEEDING: Primary | ICD-10-CM

## 2018-07-19 DIAGNOSIS — N88.8 CERVICAL MASS: ICD-10-CM

## 2018-07-19 DIAGNOSIS — C53.1 MALIGNANT NEOPLASM OF EXOCERVIX (H): ICD-10-CM

## 2018-07-19 LAB
ALBUMIN SERPL-MCNC: 3.6 G/DL (ref 3.4–5)
ALP SERPL-CCNC: 118 U/L (ref 40–150)
ALT SERPL W P-5'-P-CCNC: 85 U/L (ref 0–50)
ANION GAP SERPL CALCULATED.3IONS-SCNC: 8 MMOL/L (ref 3–14)
ANISOCYTOSIS BLD QL SMEAR: ABNORMAL
AST SERPL W P-5'-P-CCNC: 112 U/L (ref 0–45)
BASOPHILS # BLD AUTO: 0.1 10E9/L (ref 0–0.2)
BASOPHILS NFR BLD AUTO: 0.5 %
BILIRUB SERPL-MCNC: 0.4 MG/DL (ref 0.2–1.3)
BUN SERPL-MCNC: 11 MG/DL (ref 7–30)
CALCIUM SERPL-MCNC: 9.4 MG/DL (ref 8.5–10.1)
CHLORIDE SERPL-SCNC: 98 MMOL/L (ref 94–109)
CO2 SERPL-SCNC: 26 MMOL/L (ref 20–32)
CREAT SERPL-MCNC: 0.81 MG/DL (ref 0.52–1.04)
DIFFERENTIAL METHOD BLD: ABNORMAL
EOSINOPHIL # BLD AUTO: 0.1 10E9/L (ref 0–0.7)
EOSINOPHIL NFR BLD AUTO: 0.8 %
ERYTHROCYTE [DISTWIDTH] IN BLOOD BY AUTOMATED COUNT: 22.5 % (ref 10–15)
GFR SERPL CREATININE-BSD FRML MDRD: 76 ML/MIN/1.7M2
GLUCOSE SERPL-MCNC: 89 MG/DL (ref 70–99)
HCT VFR BLD AUTO: 35.2 % (ref 35–47)
HGB BLD-MCNC: 10.1 G/DL (ref 11.7–15.7)
IMM GRANULOCYTES # BLD: 0.3 10E9/L (ref 0–0.4)
IMM GRANULOCYTES NFR BLD: 1.8 %
LYMPHOCYTES # BLD AUTO: 1.8 10E9/L (ref 0.8–5.3)
LYMPHOCYTES NFR BLD AUTO: 11.1 %
MCH RBC QN AUTO: 18.7 PG (ref 26.5–33)
MCHC RBC AUTO-ENTMCNC: 28.7 G/DL (ref 31.5–36.5)
MCV RBC AUTO: 65 FL (ref 78–100)
MICROCYTES BLD QL SMEAR: PRESENT
MONOCYTES # BLD AUTO: 1.3 10E9/L (ref 0–1.3)
MONOCYTES NFR BLD AUTO: 7.5 %
NEUTROPHILS # BLD AUTO: 13 10E9/L (ref 1.6–8.3)
NEUTROPHILS NFR BLD AUTO: 78.3 %
NRBC # BLD AUTO: 0 10*3/UL
NRBC BLD AUTO-RTO: 0 /100
OVALOCYTES BLD QL SMEAR: ABNORMAL
PLATELET # BLD AUTO: 463 10E9/L (ref 150–450)
PLATELET # BLD EST: ABNORMAL 10*3/UL
POIKILOCYTOSIS BLD QL SMEAR: ABNORMAL
POLYCHROMASIA BLD QL SMEAR: ABNORMAL
POTASSIUM SERPL-SCNC: 3.4 MMOL/L (ref 3.4–5.3)
PROT SERPL-MCNC: 8.6 G/DL (ref 6.8–8.8)
RBC # BLD AUTO: 5.4 10E12/L (ref 3.8–5.2)
RBC INCLUSIONS BLD: ABNORMAL
SODIUM SERPL-SCNC: 132 MMOL/L (ref 133–144)
WBC # BLD AUTO: 16.6 10E9/L (ref 4–11)

## 2018-07-19 PROCEDURE — 80053 COMPREHEN METABOLIC PANEL: CPT | Performed by: OBSTETRICS & GYNECOLOGY

## 2018-07-19 PROCEDURE — 00000159 ZZHCL STATISTIC H-SEND OUTS PREP: Performed by: OBSTETRICS & GYNECOLOGY

## 2018-07-19 PROCEDURE — G0463 HOSPITAL OUTPT CLINIC VISIT: HCPCS | Mod: ZF

## 2018-07-19 PROCEDURE — 88305 TISSUE EXAM BY PATHOLOGIST: CPT | Performed by: OBSTETRICS & GYNECOLOGY

## 2018-07-19 PROCEDURE — 87624 HPV HI-RISK TYP POOLED RSLT: CPT | Performed by: OBSTETRICS & GYNECOLOGY

## 2018-07-19 PROCEDURE — 85025 COMPLETE CBC W/AUTO DIFF WBC: CPT | Performed by: OBSTETRICS & GYNECOLOGY

## 2018-07-19 PROCEDURE — 88342 IMHCHEM/IMCYTCHM 1ST ANTB: CPT | Performed by: OBSTETRICS & GYNECOLOGY

## 2018-07-19 PROCEDURE — 88341 IMHCHEM/IMCYTCHM EA ADD ANTB: CPT | Performed by: OBSTETRICS & GYNECOLOGY

## 2018-07-19 PROCEDURE — 99205 OFFICE O/P NEW HI 60 MIN: CPT | Mod: ZP | Performed by: OBSTETRICS & GYNECOLOGY

## 2018-07-19 PROCEDURE — 00000160 ZZHCL STATISTIC H-SPECIAL HANDLING: Performed by: OBSTETRICS & GYNECOLOGY

## 2018-07-19 RX ORDER — OXYCODONE AND ACETAMINOPHEN 5; 325 MG/1; MG/1
1 TABLET ORAL EVERY 6 HOURS PRN
Qty: 40 TABLET | Refills: 0 | Status: SHIPPED | OUTPATIENT
Start: 2018-07-19

## 2018-07-19 RX ORDER — HYDROCODONE BITARTRATE AND ACETAMINOPHEN 5; 325 MG/1; MG/1
TABLET ORAL
Refills: 0 | COMMUNITY
Start: 2018-07-16

## 2018-07-19 RX ORDER — OXYCODONE HYDROCHLORIDE 5 MG/1
5 TABLET ORAL EVERY 6 HOURS PRN
Qty: 20 TABLET | Refills: 0 | Status: SHIPPED | OUTPATIENT
Start: 2018-07-19

## 2018-07-19 ASSESSMENT — PAIN SCALES - GENERAL: PAINLEVEL: MODERATE PAIN (5)

## 2018-07-19 NOTE — MR AVS SNAPSHOT
"              After Visit Summary   7/19/2018    Yarely Garcia    MRN: [id number]           Patient Information     Date Of Birth          1970        Visit Information        Provider Department      7/19/2018 2:30 PM Antonieta Sawyer MD [facility]        Today's Diagnoses     Vaginal bleeding    -  1    Cervical mass        Malignant neoplasm of exocervix (H)        Pelvic pain in female           Follow-ups after your visit        Who to contact     If you have questions or need follow up information about today's clinic visit or your schedule please contact [facility] directly at [phone].  Normal or non-critical lab and imaging results will be communicated to you by St[employer], letter or phone within 4 business days after the clinic has received the results. If you do not hear from us within 7 days, please contact the clinic through [employer] or phone. If you have a critical or abnormal lab result, we will notify you by phone as soon as possible.  Submit refill requests through [facility] or call your pharmacy and they will forward the refill request to us. Please allow 3 business days for your refill to be completed.          Additional Information About Your Visit        MyChart Information     [employer] gives you secure access to your electronic health record. If you see a primary care provider, you can also send messages to your care team and make appointments. If you have questions, please call your primary care clinic.  If you do not have a primary care provider, please call [phone] and they will assist you.        Care EveryWhere ID     This is your Care EveryWhere ID. This could be used by other organizations to access your [place] medical records  [id number]        Your Vitals Were     Pulse Temperature Height Last Period BMI (Body Mass Index)       113 98.2  F (36.8  C) (Oral) 1.585 m (5' 2.4\") (Approximate) 28.2 kg/m2        Blood Pressure from Last " 3 Encounters:   07/19/18 93/65   06/26/18 123/77    Weight from Last 3 Encounters:   07/19/18 70.9 kg (156 lb 3.2 oz)              We Performed the Following     CBC with platelets differential     CMP - Comprehensive Metabolic Panel     HPV Scr w REF to [facility] Anal PAP or Tissue     Surgical Pathology Exam          Today's Medication Changes          These changes are accurate as of 7/19/18 11:59 PM.  If you have any questions, ask your nurse or doctor.               Start taking these medicines.        Dose/Directions    oxyCODONE IR 5 MG tablet   Commonly known as:  ROXICODONE   Used for:  Pelvic pain in female, Malignant neoplasm of exocervix (H), Cervical mass, Vaginal bleeding   Started by:  Antonieta Sawyer MD        Dose:  5 mg   Take 1 tablet (5 mg) by mouth every 6 hours as needed for pain   Quantity:  20 tablet   Refills:  0       oxyCODONE-acetaminophen 5-325 MG per tablet   Commonly known as:  PERCOCET   Used for:  Vaginal bleeding, Cervical mass, Pelvic pain in female, Malignant neoplasm of exocervix (H)   Started by:  Antonieta Sawyer MD        Dose:  1 tablet   Take 1 tablet by mouth every 6 hours as needed for pain   Quantity:  40 tablet   Refills:  0            Where to get your medicines      Some of these will need a paper prescription and others can be bought over the counter.  Ask your nurse if you have questions.     Bring a paper prescription for each of these medications     oxyCODONE IR 5 MG tablet    oxyCODONE-acetaminophen 5-325 MG per tablet               Information about OPIOIDS     PRESCRIPTION OPIOIDS: WHAT YOU NEED TO KNOW   We gave you an opioid (narcotic) pain medicine. It is important to manage your pain, but opioids are not always the best choice. You should first try all the other options your care team gave you. Take this medicine for as short a time (and as few doses) as possible.    Some activities can increase your pain, such as bandage changes or therapy sessions. It  may help to take your pain medicine 30 to 60 minutes before these activities. Reduce your stress by getting enough sleep, working on hobbies you enjoy and practicing relaxation or meditation. Talk to your care team about ways to manage your pain beyond prescription opioids.    These medicines have risks:    DO NOT drive when on new or higher doses of pain medicine. These medicines can affect your alertness and reaction times, and you could be arrested for driving under the influence (DUI). If you need to use opioids long-term, talk to your care team about driving.    DO NOT operate heavy machinery    DO NOT do any other dangerous activities while taking these medicines.    DO NOT drink any alcohol while taking these medicines.     If the opioid prescribed includes acetaminophen, DO NOT take with any other medicines that contain acetaminophen. Read all labels carefully. Look for the word  acetaminophen  or  Tylenol.  Ask your pharmacist if you have questions or are unsure.    You can get addicted to pain medicines, especially if you have a history of addiction (chemical, alcohol or substance dependence). Talk to your care team about ways to reduce this risk.    All opioids tend to cause constipation. Drink plenty of water and eat foods that have a lot of fiber, such as fruits, vegetables, prune juice, apple juice and high-fiber cereal. Take a laxative (Miralax, milk of magnesia, Colace, Senna) if you don t move your bowels at least every other day. Other side effects include upset stomach, sleepiness, dizziness, throwing up, tolerance (needing more of the medicine to have the same effect), physical dependence and slowed breathing.    Store your pills in a secure place, locked if possible. We will not replace any lost or stolen medicine. If you don t finish your medicine, please throw away (dispose) as directed by your pharmacist. The [place] Pollution Control Agency has more information about safe disposal:  [email or web address]         Primary Care Provider Office Phone # Fax #    [facility] [phone]        [facility] [address]        Equal Access to Services     MARLENEELROY MARK : Hadii aad ku hadasho Soomaali, waaxda luqadaha, qaybta kaalmada adeegyada, waxay idiin hayaan adeashutosh briscoe lakilliann zoila. So [facility] [phone].    ATENCIÓN: Si habla español, tiene a quiñonez disposición servicios gratuitos de asistencia lingüística. Llame al [phone].    We comply with applicable federal civil rights laws and [place] laws. We do not discriminate on the basis of race, color, national origin, age, disability, sex, sexual orientation, or gender identity.            Thank you!     Thank you for choosing [facility]  for your care. Our goal is always to provide you with excellent care. Hearing back from our patients is one way we can continue to improve our services. Please take a few minutes to complete the written survey that you may receive in the mail after your visit with us. Thank you!             Your Updated Medication List - Protect others around you: Learn how to safely use, store and throw away your medicines at www.disposemymeds.org.          This list is accurate as of 7/19/18 11:59 PM.  Always use your most recent med list.                   Brand Name Dispense Instructions for use Diagnosis    clotrimazole 1 % cream    LOTRIMIN    60 g    Apply topically 2 times daily    Tinea corporis       HYDROcodone-acetaminophen 5-325 MG per tablet    NORCO          oxyCODONE IR 5 MG tablet    ROXICODONE    20 tablet    Take 1 tablet (5 mg) by mouth every 6 hours as needed for pain    Pelvic pain in female, Malignant neoplasm of exocervix (H), Cervical mass, Vaginal bleeding       oxyCODONE-acetaminophen 5-325 MG per tablet    PERCOCET    40 tablet    Take 1 tablet by mouth every 6 hours as needed for pain     Vaginal bleeding, Cervical mass, Pelvic pain in female, Malignant neoplasm of exocervix (H)

## 2018-07-19 NOTE — LETTER
2018       RE: Yarely Garcia  [address]  [place], [place] [address]     Dear Colleague,    Thank you for referring your patient, Yarely Garcai, to the [facility]. Please see a copy of my visit note below.                            Consult Notes on Referred Patient    Dr. Carmelita Andrade  [facility]  [address] [place] [address]     RE: Yarely Garcia  : 1970  DALIA: 2018     Yarely Garcia is a very pleasant 48 year old woman with a recent diagnosis of cervical versus uterine carcinoma.  Given these findings she was subsequently sent to the [facility] Cancer [facility] for new patient consultation.     Brief History:  2018: saw an OBGYN at [facility]. US showed an enlarged uterus without fibroids. A hysterectomy was recommended at this time. Hgb 8.9    18: saw another OBGYN for consult for a robotic hysterectomy. An endometrial biopsy was attempted but was unable to be performed due to profuse vaginal bleeding.     18: Endometrial biopsy attempted again, unable to perform due to closed cervical os. Uterus noted to be enlarged and cervix appeared abnormal. Cervix was hard, uterus mobile.   Pap smear - positive for malignancy    18: Pelvic US  Results: anteverted uterus, presence of fibroids with possible 1.45 cm pedunculated fibroid on the right. 4.02 x 3.15 x 6.07 cm mass was noted posterior and to the right of the cervix. Unable to determine if ovarian. L ovary visualized transabdominally, appears normal. No fluid in cul de sac.     18: CT cap  Impression:  1. Findings compatible with extensive metastatic disease. Innumerable noncalcified pulmonary nodules, bilateral enlarged hilar nodules, retrocrural, retroperitoneal and pelvic lymphadenopathy. Enlarged mesenteric and adnexal nodules in the low abdomen.   2. Lytic lesion in the right inferior sacrum measuring 3.1 cm again suspicious for metastatic involvement. No other definitive bone  lesions appreciated.   3. Diffuse enlargement of the uterus and cervix  4. Liver and adrenals appear unremarkable  5. No bowel or biliary abnormality.  6. There appears to be extrinsic compression of the external iliac veins from bulky pelvic nodes.  7. Mild prominence of the upper ureters w/o overt hydronephrosis.       Today:   Starting in May she has had bleeding for 3 weeks. Starting again on June 4th she has had bleeding and it hasn't stopped since. She also has a discharge that has a foul odor to it. She has some abdominal pain but most of her pain is across her lower back and down her thighs. She sometimes had a tingling sensation as well. She was started on tylenol with oxycodone on Monday for her back and leg pain because tylenol alone was not covering her pain. She is concerned about taking too much pain medications and possible constipation as a result.    She has had difficulty emptying her bladder for the past few days. She hasn't had a bowel movement since Tuesday but also has not been eating much lately. She doesn't have much of an appetite but has been making sure she gets enough nutrition. She is employed as a dietician.     The patient's most recent hemoglobin was 8.9 at which time she was started on Iron. Her recent pap smear was positive for malignancy and a biopsy was taken. She was told she has evidence of metastasis but further conversation regarding her diagnosis was deferred to her visit with an oncologist.    I have reviewed and addressed the patient's review of symptoms for today's visit.     Past Medical History:  Abnormal vaginal bleeding  Anemia  cervical mass  Enlarged uterus  menorrhagia with irregular cycle    Past Surgical History:  No history of surgery.     Health Maintenance:  Health Maintenance Due   Topic Date Due     PHQ-2 Q1 YR  04/22/1984     TETANUS IMMUNIZATION (SYSTEM ASSIGNED)  04/22/1990     HIV SCREEN (SYSTEM ASSIGNED)  04/22/1990     PAP SCREENING Q3 YR (SYSTEM  "ASSIGNED)  04/22/1993     LIPID SCREEN Q5 YR FEMALE (SYSTEM ASSIGNED)  04/22/2017       Last Pap Smear: 7/11/18 Result: positive for malignancy,   She has not had a previous history of abnormal Pap smears.    Last Mammogram: 7/20/2017, mass in posterior L lateral breast is stable    Last Colonoscopy: Not done    Current Medications:   has a current medication list which includes the following prescription(s): clotrimazole.     Allergies:      Allergies   Allergen Reactions     Nickel          Social History:  Social History   Substance Use Topics     Smoking status: Never Smoker     Smokeless tobacco: Never Used     Alcohol use Not on file       History   Drug Use Not on file       Family History:   The patient's family history is notable for: No known cancers.    Family History   Problem Relation Age of Onset     Melanoma No family hx of      Skin Cancer No family hx of        Physical Exam:   BP 93/65  Pulse 113  Temp 98.2  F (36.8  C) (Oral)  Ht 1.585 m (5' 2.4\")  Wt 70.9 kg (156 lb 3.2 oz)  LMP  (Approximate)  BMI 28.2 kg/m2  Body mass index is 28.2 kg/(m^2).    General Appearance: healthy and alert, distress with diagnosis appropriate     HEENT:  no thyromegaly, no palpable nodules or masses        Cardiovascular: Tachycardic with a regular rhythm, no gallops, rubs or murmurs     Respiratory: lungs clear, no rales, rhonchi or wheezes, normal diaphragmatic excursion    Musculoskeletal: extremities non tender and without edema    Skin: no lesions or rashes     Neurological: normal gait, no gross defects     Psychiatric: appropriate mood and affect                               Hematological: normal cervical, supraclavicular and inguinal lymph nodes     Gastrointestinal:       abdomen firm up to the level of the umbilicus, non-tender, moderately-distended, no organomegaly or masses    Genitourinary: External genitalia and urethral meatus appears normal.  Vagina is smooth without nodularity.  Cervix is " completely replaced by a necrotic mass with bloody discharge in the vaginal vault. Bimanual exam reveals a firm mass extending several centimeters laterally on both sides, uterus is enlarged but mobile.  Recto-vaginal exam confirms these findings.      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     Plan:    1.) Cervical versus endometrial carcinoma: I discussed the patients exam findings and imaging with her and her  today. Her cervical exam is suggestive of cervical carcinoma but given her lack of abnormal pap smears, negative HPV testing and lack of obstruction of the ureters it is also very likely that this might be an endometrial cancer that has infiltrated into the cervix. The patient is scheduled for a PET scan at [facility] this week. Based on her recent CT imaging she likely has stage IV disease and is most likely not a surgical candidate. I discussed with the patient that based on the current information, I would recommended chemotherapy but we will know more as more information is gathered.  - biopsies were taken of the cervical tumor today  - Patient scheduled for PET scan this week  - labs were ordered to follow up on recent blood loss anemia and continued bleeding. Her hgb is increased from 8.9 to 10.1   - Follow up after PET results are back     2.) Genetic risk factors were assessed and the patient does not meet the qualifications for a referral at this time.    3.) Labs and/or tests ordered include:  CBC, CMP, cervical biopsy     4.) Health maintenance issues addressed today include: none     5.)  Pain: the patient has been having a lot of pain in the past week and has been taking percocet for this. She needs a refill of this which was provided today. She was also given a prescription for 5 mg oxycodone as the percocet is not completely covering  her pain.       Thank you for allowing us to participate in the care of your patient.       Sincerely,    Antonieta Sawyer MD    Department of Ob/Gyn and Women's Health  Division of Gynecologic Oncology  [facility]  [phone]    I saw and evaluated the patient with the medical student. I reviewed and edited the above note.  The medical student acted as a scribe for this visit.    CC  Patient Care Team:  [facility] PCP - NICOLE Perez      This document serves as a record of the services and decisions personally performed by Antonieta Sawyer MD. It was created on her behalf by Fer Ortiz, a trained medical student. The creation of this record is based on the provider's observations and the statements of the patient.      Fer Ortiz, MS4  July 19, 2018    Again, thank you for allowing me to participate in the care of your patient.      Sincerely,    Antonieta Sawyer MD

## 2018-07-19 NOTE — NURSING NOTE
"Oncology Rooming Note    July 19, 2018 2:31 PM   Yarely Garcia is a 48 year old female who presents for:    Chief Complaint   Patient presents with     Oncology Clinic Visit     New; Recently dx Cervical CA     Initial Vitals: BP 93/65  Pulse 113  Temp 98.2  F (36.8  C) (Oral)  Ht 1.585 m (5' 2.4\")  Wt 70.9 kg (156 lb 3.2 oz)  LMP  (Approximate)  BMI 28.2 kg/m2 Estimated body mass index is 28.2 kg/(m^2) as calculated from the following:    Height as of this encounter: 1.585 m (5' 2.4\").    Weight as of this encounter: 70.9 kg (156 lb 3.2 oz). Body surface area is 1.77 meters squared.  Moderate Pain (5) Comment: abdomen, hip    No LMP recorded (approximate).  Allergies reviewed: Yes  Medications reviewed: Yes    Medications: Medication refills not needed today.  Pharmacy name entered into [employer] network: [facility] [address] [place] [address] [address] [facility] [address]    Clinical concerns: Here to discuss recent dx of Cervical CA Dr Sawyer was notified.    12 minutes for nursing intake (face to face time)     Rosalina Cosme CMA              "

## 2018-07-19 NOTE — NURSING NOTE
Patient labs drawn in clinic via venipuncture. Patient tolerated well. See flow sheet.    Lorenza Burnett CMA ([facility])

## 2018-07-23 LAB — COPATH REPORT: NORMAL

## 2018-07-25 LAB — COPATH REPORT: NORMAL

## 2018-07-27 LAB
COPATH REPORT: NORMAL
COPATH REPORT: NORMAL

## 2018-08-09 ENCOUNTER — PRE VISIT (OUTPATIENT)
Dept: ONCOLOGY | Facility: CLINIC | Age: 48
End: 2018-08-09

## 2018-08-21 ENCOUNTER — TELEPHONE (OUTPATIENT)
Dept: ONCOLOGY | Facility: CLINIC | Age: 48
End: 2018-08-21

## 2018-08-21 DIAGNOSIS — C53.1 MALIGNANT NEOPLASM OF EXOCERVIX (H): Primary | ICD-10-CM

## 2018-08-21 NOTE — TELEPHONE ENCOUNTER
Left message with family member, because patient was in the shower. Instructed order is in for PICC dressing changes so patient can call to schedule. Provided scheduling phone number [phone]  Rosa Stark RN, BSN, OCN

## 2018-08-22 ENCOUNTER — TRANSFERRED RECORDS (OUTPATIENT)
Dept: HEALTH INFORMATION MANAGEMENT | Facility: CLINIC | Age: 48
End: 2018-08-22

## 2020-03-11 ENCOUNTER — HEALTH MAINTENANCE LETTER (OUTPATIENT)
Age: 50
End: 2020-03-11